# Patient Record
Sex: FEMALE | Race: BLACK OR AFRICAN AMERICAN | Employment: FULL TIME | URBAN - METROPOLITAN AREA
[De-identification: names, ages, dates, MRNs, and addresses within clinical notes are randomized per-mention and may not be internally consistent; named-entity substitution may affect disease eponyms.]

---

## 2018-06-25 PROBLEM — N92.6 IRREGULAR MENSES: Status: ACTIVE | Noted: 2018-06-25

## 2018-06-25 PROBLEM — Z11.3 SCREEN FOR STD (SEXUALLY TRANSMITTED DISEASE): Status: ACTIVE | Noted: 2018-06-25

## 2018-07-12 PROBLEM — Z30.46 NEXPLANON REMOVAL: Status: ACTIVE | Noted: 2018-07-12

## 2019-04-26 ENCOUNTER — HOSPITAL ENCOUNTER (EMERGENCY)
Age: 24
Discharge: HOME | End: 2019-04-26
Attending: EMERGENCY MEDICINE
Payer: COMMERCIAL

## 2019-04-26 VITALS
WEIGHT: 173 LBS | TEMPERATURE: 98.6 F | HEART RATE: 79 BPM | RESPIRATION RATE: 15 BRPM | SYSTOLIC BLOOD PRESSURE: 138 MMHG | OXYGEN SATURATION: 100 % | BODY MASS INDEX: 29.53 KG/M2 | HEIGHT: 64 IN | DIASTOLIC BLOOD PRESSURE: 83 MMHG

## 2019-04-26 DIAGNOSIS — R11.2 NAUSEA AND VOMITING, INTRACTABILITY OF VOMITING NOT SPECIFIED, UNSPECIFIED VOMITING TYPE: ICD-10-CM

## 2019-04-26 DIAGNOSIS — N30.00 ACUTE CYSTITIS WITHOUT HEMATURIA: Primary | ICD-10-CM

## 2019-04-26 LAB
BACTERIA URNS QL MICRO: NORMAL /HPF
CASTS URNS QL MICRO: 0 /LPF
CRYSTALS URNS QL MICRO: 0 /LPF
EPI CELLS #/AREA URNS HPF: 0 /HPF
HCG SERPL-ACNC: <1 MIU/ML (ref 0–6)
HCG UR QL: NEGATIVE
MUCOUS THREADS URNS QL MICRO: NORMAL /LPF
RBC #/AREA URNS HPF: NORMAL /HPF
WBC URNS QL MICRO: NORMAL /HPF

## 2019-04-26 PROCEDURE — 81025 URINE PREGNANCY TEST: CPT

## 2019-04-26 PROCEDURE — 99285 EMERGENCY DEPT VISIT HI MDM: CPT | Performed by: EMERGENCY MEDICINE

## 2019-04-26 PROCEDURE — 84702 CHORIONIC GONADOTROPIN TEST: CPT

## 2019-04-26 PROCEDURE — 74011250636 HC RX REV CODE- 250/636: Performed by: EMERGENCY MEDICINE

## 2019-04-26 PROCEDURE — 81015 MICROSCOPIC EXAM OF URINE: CPT

## 2019-04-26 PROCEDURE — 81003 URINALYSIS AUTO W/O SCOPE: CPT | Performed by: EMERGENCY MEDICINE

## 2019-04-26 PROCEDURE — 96374 THER/PROPH/DIAG INJ IV PUSH: CPT | Performed by: EMERGENCY MEDICINE

## 2019-04-26 RX ORDER — SODIUM CHLORIDE 0.9 % (FLUSH) 0.9 %
5-40 SYRINGE (ML) INJECTION EVERY 8 HOURS
Status: DISCONTINUED | OUTPATIENT
Start: 2019-04-26 | End: 2019-04-27 | Stop reason: HOSPADM

## 2019-04-26 RX ORDER — SODIUM CHLORIDE 9 MG/ML
1000 INJECTION, SOLUTION INTRAVENOUS ONCE
Status: COMPLETED | OUTPATIENT
Start: 2019-04-26 | End: 2019-04-26

## 2019-04-26 RX ORDER — ONDANSETRON 2 MG/ML
4 INJECTION INTRAMUSCULAR; INTRAVENOUS
Status: COMPLETED | OUTPATIENT
Start: 2019-04-26 | End: 2019-04-26

## 2019-04-26 RX ORDER — SODIUM CHLORIDE 0.9 % (FLUSH) 0.9 %
5-40 SYRINGE (ML) INJECTION AS NEEDED
Status: DISCONTINUED | OUTPATIENT
Start: 2019-04-26 | End: 2019-04-27 | Stop reason: HOSPADM

## 2019-04-26 RX ORDER — NITROFURANTOIN 25; 75 MG/1; MG/1
100 CAPSULE ORAL 2 TIMES DAILY
Qty: 14 CAP | Refills: 0 | Status: SHIPPED | OUTPATIENT
Start: 2019-04-26 | End: 2019-05-03

## 2019-04-26 RX ORDER — ONDANSETRON 8 MG/1
8 TABLET, ORALLY DISINTEGRATING ORAL
Qty: 12 TAB | Refills: 0 | Status: SHIPPED | OUTPATIENT
Start: 2019-04-26 | End: 2019-12-31 | Stop reason: CLARIF

## 2019-04-26 RX ADMIN — SODIUM CHLORIDE 1000 ML: 900 INJECTION, SOLUTION INTRAVENOUS at 22:21

## 2019-04-26 RX ADMIN — ONDANSETRON 4 MG: 2 INJECTION INTRAMUSCULAR; INTRAVENOUS at 22:21

## 2019-04-27 NOTE — DISCHARGE INSTRUCTIONS
Patient Education      Zofran every 6-8 hours as needed for nausea and vomiting. Small amounts of nonrigid Gatorade G2 Pedialyte or water every 5-10 minutes to prevent dehydration. If he vomited take a 20 minute break and started again. Advance diet as tolerated. Macrobid twice daily for 7 days for urinary tract infection. Return if any new, worsening or concerning symptoms. Follow up with your doctor or the doctor provided in 3-5 days if not improving. Nausea and Vomiting: Care Instructions  Your Care Instructions    When you are nauseated, you may feel weak and sweaty and notice a lot of saliva in your mouth. Nausea often leads to vomiting. Most of the time you do not need to worry about nausea and vomiting, but they can be signs of other illnesses. Two common causes of nausea and vomiting are stomach flu and food poisoning. Nausea and vomiting from viral stomach flu will usually start to improve within 24 hours. Nausea and vomiting from food poisoning may last from 12 to 48 hours. The doctor has checked you carefully, but problems can develop later. If you notice any problems or new symptoms, get medical treatment right away. Follow-up care is a key part of your treatment and safety. Be sure to make and go to all appointments, and call your doctor if you are having problems. It's also a good idea to know your test results and keep a list of the medicines you take. How can you care for yourself at home? · To prevent dehydration, drink plenty of fluids, enough so that your urine is light yellow or clear like water. Choose water and other caffeine-free clear liquids until you feel better. If you have kidney, heart, or liver disease and have to limit fluids, talk with your doctor before you increase the amount of fluids you drink. · Rest in bed until you feel better. · When you are able to eat, try clear soups, mild foods, and liquids until all symptoms are gone for 12 to 48 hours.  Other good choices include dry toast, crackers, cooked cereal, and gelatin dessert, such as Jell-O. When should you call for help? Call 911 anytime you think you may need emergency care. For example, call if:    · You passed out (lost consciousness).    Call your doctor now or seek immediate medical care if:    · You have symptoms of dehydration, such as:  ? Dry eyes and a dry mouth. ? Passing only a little dark urine. ? Feeling thirstier than usual.     · You have new or worsening belly pain.     · You have a new or higher fever.     · You vomit blood or what looks like coffee grounds.    Watch closely for changes in your health, and be sure to contact your doctor if:    · You have ongoing nausea and vomiting.     · Your vomiting is getting worse.     · Your vomiting lasts longer than 2 days.     · You are not getting better as expected. Where can you learn more? Go to http://dionna-kelsy.info/. Enter 25 [id number] in the search box to learn more about \"Nausea and Vomiting: Care Instructions. \"  Current as of: September 23, 2018  Content Version: 11.9  © 8942-0653 [email or web address]. Care instructions adapted under license by [email or web address] (which disclaims liability or warranty for this information). If you have questions about a medical condition or this instruction, always ask your healthcare professional. [place] [address] any warranty or liability for your use of this information. Patient Education        Urinary Tract Infection in Women: Care Instructions  Your Care Instructions    A urinary tract infection, or UTI, is a general term for an infection anywhere between the kidneys and the urethra (where urine comes out). Most UTIs are bladder infections. They often cause pain or burning when you urinate. UTIs are caused by bacteria and can be cured with antibiotics. Be sure to complete your treatment so that the infection goes away.   Follow-up care is a key part of your treatment and safety. Be sure to make and go to all appointments, and call your doctor if you are having problems. It's also a good idea to know your test results and keep a list of the medicines you take. How can you care for yourself at home? · Take your antibiotics as directed. Do not stop taking them just because you feel better. You need to take the full course of antibiotics. · Drink extra water and other fluids for the next day or two. This may help wash out the bacteria that are causing the infection. (If you have kidney, heart, or liver disease and have to limit fluids, talk with your doctor before you increase your fluid intake.)  · Avoid drinks that are carbonated or have caffeine. They can irritate the bladder. · Urinate often. Try to empty your bladder each time. · To relieve pain, take a hot bath or lay a heating pad set on low over your lower belly or genital area. Never go to sleep with a heating pad in place. To prevent UTIs  · Drink plenty of water each day. This helps you urinate often, which clears bacteria from your system. (If you have kidney, heart, or liver disease and have to limit fluids, talk with your doctor before you increase your fluid intake.)  · Urinate when you need to. · Urinate right after you have sex. · Change sanitary pads often. · Avoid douches, bubble baths, feminine hygiene sprays, and other feminine hygiene products that have deodorants. · After going to the bathroom, wipe from front to back. When should you call for help? Call your doctor now or seek immediate medical care if:    · Symptoms such as fever, chills, nausea, or vomiting get worse or appear for the first time.     · You have new pain in your back just below your rib cage.  This is called flank pain.     · There is new blood or pus in your urine.     · You have any problems with your antibiotic medicine.    Watch closely for changes in your health, and be sure to contact your doctor if:    · You are not getting better after taking an antibiotic for 2 days.     · Your symptoms go away but then come back. Where can you learn more? Go to http://dionna-kelsy.info/. Enter F965 in the search box to learn more about \"Urinary Tract Infection in Women: Care Instructions. \"  Current as of: March 20, 2018  Content Version: 11.9  © 1299-0938 [employer]. Care instructions adapted under license by [employer] (which disclaims liability or warranty for this information). If you have questions about a medical condition or this instruction, always ask your healthcare professional. [place] [address] any warranty or liability for your use of this information.

## 2019-04-27 NOTE — ED PROVIDER NOTES
Nausea and vomiting this morning. Missed her last menstrual cycle. patient believes she may be coming down with a urinary tract infection as well. No abdominal pain fever or back pain. The history is provided by the patient. Nausea This is a new problem. Episode onset: this morning. Episode frequency: vomited once or twice this morning. The problem has been gradually improving. The emesis has an appearance of stomach contents. There has been no fever. Pertinent negatives include no fever, no abdominal pain, no cough and no URI. The patient maybe pregnant. Vomiting Pertinent negatives include no fever, no abdominal pain, no cough and no URI. The patient maybe pregnant. History reviewed. No pertinent past medical history. Past Surgical History:  
Procedure Laterality Date  HX TYMPANOSTOMY Family History:  
Problem Relation Age of Onset  Hypertension Mother  Diabetes Mother  Diabetes Maternal Grandmother  Hypertension Maternal Grandmother  Kidney Disease Maternal Grandmother  Colon Cancer Maternal Grandfather  Cancer Paternal Grandmother  Breast Cancer Neg Hx   
 Ovarian Cancer Neg Hx  Uterine Cancer Neg Hx Social History Socioeconomic History  Marital status: SINGLE Spouse name: Not on file  Number of children: Not on file  Years of education: Not on file  Highest education level: Not on file Occupational History  Not on file Social Needs  Financial resource strain: Not on file  Food insecurity:  
  Worry: Not on file Inability: Not on file  Transportation needs:  
  Medical: Not on file Non-medical: Not on file Tobacco Use  Smoking status: Never Smoker  Smokeless tobacco: Never Used Substance and Sexual Activity  Alcohol use: No  
 Drug use: No  
 Sexual activity: Yes Birth control/protection: Implant Lifestyle  Physical activity:  
  Days per week: Not on file Minutes per session: Not on file  Stress: Not on file Relationships  Social connections:  
  Talks on phone: Not on file Gets together: Not on file Attends [employer] service: Not on file Active member of club or organization: Not on file Attends meetings of clubs or organizations: Not on file Relationship status: Not on file  Intimate partner violence:  
  Fear of current or ex partner: Not on file Emotionally abused: Not on file Physically abused: Not on file Forced sexual activity: Not on file Other Topics Concern [address] Service Not Asked  Blood Transfusions Not Asked  Caffeine Concern No  
 Occupational Exposure Not Asked Debe Moreno Hazards Not Asked  Sleep Concern Not Asked  Stress Concern Not Asked  Weight Concern Not Asked  Special Diet Not Asked  Back Care Not Asked  Exercise Yes  Bike Helmet Not Asked [address] Yes  Self-Exams Yes Social History Narrative Abuse: Feels safe at home, no history of physical abuse, no history of sexual abuse ALLERGIES: Latex Review of Systems Constitutional: Negative for fever. HENT: Negative for congestion and rhinorrhea. Respiratory: Negative for cough. Gastrointestinal: Positive for nausea and vomiting. Negative for abdominal pain. Endocrine: Negative for polyuria. Genitourinary: Positive for dysuria, frequency and urgency. Negative for flank pain. Musculoskeletal: Negative for back pain. Vitals:  
 04/26/19 2113 BP: 138/83 Pulse: 79 Resp: 15 Temp: 98.6 °F (37 °C) SpO2: 100% Weight: 78.5 kg (173 lb) Height: 5' 4\" (1.626 m) Physical Exam  
Constitutional: She appears well-developed and well-nourished. No distress. HENT:  
Head: Normocephalic. Mouth/Throat: Oropharynx is clear and moist.  
Eyes: Pupils are equal, round, and reactive to light. Cardiovascular: Normal rate, regular rhythm and normal heart sounds. Pulmonary/Chest: Effort normal and breath sounds normal.  
Abdominal: Soft. She exhibits no distension and no mass. There is no tenderness. There is no rebound and no guarding. Musculoskeletal: Normal range of motion. Lymphadenopathy:  
  She has no cervical adenopathy. Neurological: She is alert. Skin: Skin is warm and dry. Nursing note and vitals reviewed. MDM Number of Diagnoses or Management Options Diagnosis management comments: Urine pregnancy test is negative but the mother would like a blood pregnancy test due to her having negative urine pregnancy test up to 5 months in the past.  Patient will be hydrated while we wait on this. Currently only nausea no vomiting since this morning. No appetite this afternoon. She denies any diarrhea. Likely nausea and vomiting secondary to UTI or virus. UTI present Amount and/or Complexity of Data Reviewed Clinical lab tests: ordered and reviewed (Results for orders placed or performed during the hospital encounter of 04/26/19 
-URINE MICROSCOPIC Result                      Value             Ref Range WBC                         20-50             0 /hpf        
     RBC                         3-5               0 /hpf Epithelial cells            0                 0 /hpf Bacteria                    TRACE             0 /hpf Casts                       0                 0 /lpf Crystals, urine             0                 0 /LPF Mucus                       TRACE             0 /lpf -BETA HCG, QT Result                      Value             Ref Range Beta HCG, QT                <1                0.0 - 6.0 MI* 
-HCG URINE, QL. - POC Result                      Value             Ref Range Pregnancy test,urine (*     NEGATIVE          NEG           
) Procedures

## 2019-04-27 NOTE — ED TRIAGE NOTES
Pt states she missed her menstrual cycle last month, but started spotting today. Urine Pregnancy tests were negative.

## 2019-12-31 ENCOUNTER — HOSPITAL ENCOUNTER (EMERGENCY)
Age: 24
Discharge: HOME | End: 2019-12-31
Attending: EMERGENCY MEDICINE
Payer: COMMERCIAL

## 2019-12-31 VITALS
HEART RATE: 81 BPM | SYSTOLIC BLOOD PRESSURE: 112 MMHG | HEIGHT: 64 IN | DIASTOLIC BLOOD PRESSURE: 69 MMHG | OXYGEN SATURATION: 96 % | TEMPERATURE: 98.7 F | RESPIRATION RATE: 16 BRPM | BODY MASS INDEX: 26.29 KG/M2 | WEIGHT: 154 LBS

## 2019-12-31 DIAGNOSIS — B34.9 VIRAL ILLNESS: Primary | ICD-10-CM

## 2019-12-31 LAB
FLUAV AG NPH QL IA: NEGATIVE
FLUBV AG NPH QL IA: NEGATIVE
SPECIMEN SOURCE: NORMAL

## 2019-12-31 PROCEDURE — 87804 INFLUENZA ASSAY W/OPTIC: CPT

## 2019-12-31 PROCEDURE — 99282 EMERGENCY DEPT VISIT SF MDM: CPT | Performed by: NURSE PRACTITIONER

## 2019-12-31 NOTE — ED PROVIDER NOTES
Patient presents with nasal congestion, cough, generalized body aches, and sore throat since yesterday. She denies fever. She states she has been taking tylenol cold and flu. The history is provided by the patient. History reviewed. No pertinent past medical history.     Past Surgical History:   Procedure Laterality Date    HX TYMPANOSTOMY           Family History:   Problem Relation Age of Onset    Hypertension Mother     Diabetes Mother     Diabetes Maternal Grandmother     Hypertension Maternal Grandmother     Kidney Disease Maternal Grandmother     Colon Cancer Maternal Grandfather     Cancer Paternal Grandmother     Breast Cancer Neg Hx     Ovarian Cancer Neg Hx     Uterine Cancer Neg Hx        Social History     Socioeconomic History    Marital status: SINGLE     Spouse name: Not on file    Number of children: Not on file    Years of education: Not on file    Highest education level: Not on file   Occupational History    Not on file   Social Needs    Financial resource strain: Not on file    Food insecurity:     Worry: Not on file     Inability: Not on file    Transportation needs:     Medical: Not on file     Non-medical: Not on file   Tobacco Use    Smoking status: Never Smoker    Smokeless tobacco: Never Used   Substance and Sexual Activity    Alcohol use: No    Drug use: No    Sexual activity: Yes     Birth control/protection: Implant   Lifestyle    Physical activity:     Days per week: Not on file     Minutes per session: Not on file    Stress: Not on file   Relationships    Social connections:     Talks on phone: Not on file     Gets together: Not on file     Attends [employer] service: Not on file     Active member of club or organization: Not on file     Attends meetings of clubs or organizations: Not on file     Relationship status: Not on file    Intimate partner violence:     Fear of current or ex partner: Not on file     Emotionally abused: Not on file Physically abused: Not on file     Forced sexual activity: Not on file   Other Topics Concern     Service Not Asked    Blood Transfusions Not Asked    Caffeine Concern No    Occupational Exposure Not Asked    Hobby Hazards Not Asked    Sleep Concern Not Asked    Stress Concern Not Asked    Weight Concern Not Asked    Special Diet Not Asked    Back Care Not Asked    Exercise Yes    Bike Helmet Not Asked    Seat Belt Yes    Self-Exams Yes   Social History Narrative    Abuse: Feels safe at home, no history of physical abuse, no history of sexual abuse         ALLERGIES: Latex    Review of Systems   Constitutional: Negative for fever. HENT: Positive for congestion and sore throat. Respiratory: Positive for cough. Musculoskeletal: Positive for myalgias. Vitals:    12/31/19 1200   BP: 112/69   Pulse: 81   Resp: 16   Temp: 98.7 °F (37.1 °C)   SpO2: 96%   Weight: 69.9 kg (154 lb)   Height: 5' 4\" (1.626 m)            Physical Exam  Vitals signs and nursing note reviewed. Constitutional:       Appearance: Normal appearance. HENT:      Head: Normocephalic and atraumatic. Right Ear: Tympanic membrane is erythematous. Left Ear: Tympanic membrane is erythematous. Nose: Mucosal edema present. Mouth/Throat:      Mouth: Mucous membranes are moist.      Pharynx: Oropharynx is clear. Eyes:      Pupils: Pupils are equal, round, and reactive to light. Neck:      Musculoskeletal: Normal range of motion and neck supple. Cardiovascular:      Rate and Rhythm: Normal rate and regular rhythm. Pulses: Normal pulses. Heart sounds: Normal heart sounds. Pulmonary:      Effort: Pulmonary effort is normal.      Breath sounds: Normal breath sounds. Abdominal:      Tenderness: There is no tenderness. Skin:     General: Skin is warm and dry. Neurological:      General: No focal deficit present. Mental Status: She is alert.    Psychiatric:         Mood and Affect: Mood normal.         Behavior: Behavior normal.        Recent Results (from the past 12 hour(s))   INFLUENZA A & B AG (RAPID TEST)    Collection Time: 12/31/19 12:04 PM   Result Value Ref Range    Influenza A Ag NEGATIVE  NEG      Influenza B Ag NEGATIVE  NEG      Source NASOPHARYNGEAL           MDM  Number of Diagnoses or Management Options  Viral illness:   Diagnosis management comments: Flu negative.         Amount and/or Complexity of Data Reviewed  Clinical lab tests: ordered and reviewed    Patient Progress  Patient progress: stable         Procedures

## 2019-12-31 NOTE — ED TRIAGE NOTES
Pt states congested cough, sore throat and body aches since yesterday. States mother had the flu about five days ago.

## 2019-12-31 NOTE — DISCHARGE INSTRUCTIONS
Continue with the over the counter products for symptom relief. Rest and increase your fluids by mouth. Follow up with your primary care provider for a recheck if symptoms fail to improve or worsen. Return to the emergency department as needed.

## 2019-12-31 NOTE — ED NOTES
I have reviewed discharge instructions with the patient. Ms Waqas Gale verbalized understanding. Patient left ED via private vehicle to home alone. Opportunity for questions and clarification provided. Patient given no scripts. To continue your aftercare when you leave the hospital, you may receive an automated call from our care team to check in on how you are doing. This is a free service and part of our promise to provide the best care and service to meet your aftercare needs.  If you have questions, or wish to unsubscribe from this service please call [phone]. Thank you for Choosing our [facility] Emergency Department.

## 2019-12-31 NOTE — LETTER
[facility] EMERGENCY DEPT 
[id number] [facility] [id number] [phone] Work/School Note Date: 12/31/2019 To Whom It May concern: 
 
Mauricio Davis was seen and treated today in the emergency room by the following provider(s): 
Attending Provider: Aggie Christiansen MD 
Nurse Practitioner: URMILA Marie. Mauricio Davis needs to be excused from work 12/31-01/02/2020. She can return sooner if symptoms improve.   
 
Sincerely, 
 
 
 
 
URMILA Terrazas

## 2020-02-18 ENCOUNTER — APPOINTMENT (OUTPATIENT)
Dept: GENERAL RADIOLOGY | Age: 25
End: 2020-02-18
Attending: EMERGENCY MEDICINE
Payer: COMMERCIAL

## 2020-02-18 ENCOUNTER — HOSPITAL ENCOUNTER (EMERGENCY)
Age: 25
Discharge: HOME | End: 2020-02-18
Attending: STUDENT IN AN ORGANIZED HEALTH CARE EDUCATION/TRAINING PROGRAM
Payer: COMMERCIAL

## 2020-02-18 VITALS
HEIGHT: 65 IN | RESPIRATION RATE: 16 BRPM | WEIGHT: 155 LBS | HEART RATE: 94 BPM | SYSTOLIC BLOOD PRESSURE: 108 MMHG | BODY MASS INDEX: 25.83 KG/M2 | OXYGEN SATURATION: 100 % | DIASTOLIC BLOOD PRESSURE: 54 MMHG | TEMPERATURE: 98.4 F
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
## 2020-02-18 DIAGNOSIS — S16.1XXA STRAIN OF NECK MUSCLE, INITIAL ENCOUNTER: Primary | ICD-10-CM

## 2020-02-18 PROCEDURE — 99283 EMERGENCY DEPT VISIT LOW MDM: CPT

## 2020-02-18 PROCEDURE — 72040 X-RAY EXAM NECK SPINE 2-3 VW: CPT

## 2020-02-18 PROCEDURE — 74011250636 HC RX REV CODE- 250/636: Performed by: STUDENT IN AN ORGANIZED HEALTH CARE EDUCATION/TRAINING PROGRAM

## 2020-02-18 PROCEDURE — 96372 THER/PROPH/DIAG INJ SC/IM: CPT

## 2020-02-18 PROCEDURE — 74011250637 HC RX REV CODE- 250/637: Performed by: STUDENT IN AN ORGANIZED HEALTH CARE EDUCATION/TRAINING PROGRAM

## 2020-02-18 RX ORDER — CYCLOBENZAPRINE HCL 5 MG
10 TABLET ORAL
Qty: 20 TAB | Refills: 0 | Status: SHIPPED | OUTPATIENT
Start: 2020-02-18 | End: 2020-05-31

## 2020-02-18 RX ORDER — CYCLOBENZAPRINE HCL 10 MG
10 TABLET ORAL
Status: COMPLETED | OUTPATIENT
Start: 2020-02-18 | End: 2020-02-18

## 2020-02-18 RX ORDER — IBUPROFEN 800 MG/1
800 TABLET ORAL
Qty: 20 TAB | Refills: 0 | Status: SHIPPED | OUTPATIENT
Start: 2020-02-18 | End: 2020-02-25

## 2020-02-18 RX ORDER — KETOROLAC TROMETHAMINE 30 MG/ML
30 INJECTION, SOLUTION INTRAMUSCULAR; INTRAVENOUS
Status: COMPLETED | OUTPATIENT
Start: 2020-02-18 | End: 2020-02-18

## 2020-02-18 RX ADMIN — CYCLOBENZAPRINE 10 MG: 10 TABLET, FILM COATED ORAL at 19:33

## 2020-02-18 RX ADMIN — KETOROLAC TROMETHAMINE 30 MG: 30 INJECTION, SOLUTION INTRAMUSCULAR at 19:33
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
## 2020-02-19 NOTE — ED NOTES
I have reviewed discharge instructions with the patient. The patient verbalized understanding. Patient left ED via Discharge Method: ambulatory to Home with herself. Opportunity for questions and clarification provided. Patient given 2 scripts. To continue your aftercare when you leave the hospital, you may receive an automated call from our care team to check in on how you are doing. This is a free service and part of our promise to provide the best care and service to meet your aftercare needs.  If you have questions, or wish to unsubscribe from this service please call [phone]. Thank you for Choosing our [facility] Emergency Department.

## 2020-02-24 ENCOUNTER — HOSPITAL ENCOUNTER (EMERGENCY)
Age: 25
Discharge: HOME | End: 2020-02-25
Attending: EMERGENCY MEDICINE
Payer: COMMERCIAL

## 2020-02-24 DIAGNOSIS — M25.571 ACUTE RIGHT ANKLE PAIN: ICD-10-CM

## 2020-02-24 DIAGNOSIS — M25.561 ACUTE PAIN OF RIGHT KNEE: Primary | ICD-10-CM

## 2020-02-24 PROCEDURE — 99283 EMERGENCY DEPT VISIT LOW MDM: CPT

## 2020-02-25 ENCOUNTER — APPOINTMENT (OUTPATIENT)
Dept: GENERAL RADIOLOGY | Age: 25
End: 2020-02-25
Attending: EMERGENCY MEDICINE
Payer: COMMERCIAL

## 2020-02-25 VITALS
RESPIRATION RATE: 16 BRPM | DIASTOLIC BLOOD PRESSURE: 64 MMHG | SYSTOLIC BLOOD PRESSURE: 110 MMHG | TEMPERATURE: 98.8 F | WEIGHT: 154 LBS | BODY MASS INDEX: 25.66 KG/M2 | HEART RATE: 80 BPM | HEIGHT: 65 IN | OXYGEN SATURATION: 97 %

## 2020-02-25 PROCEDURE — 73610 X-RAY EXAM OF ANKLE: CPT

## 2020-02-25 PROCEDURE — 73562 X-RAY EXAM OF KNEE 3: CPT

## 2020-02-25 PROCEDURE — 74011250637 HC RX REV CODE- 250/637: Performed by: EMERGENCY MEDICINE

## 2020-02-25 RX ORDER — IBUPROFEN 800 MG/1
800 TABLET ORAL
Status: COMPLETED | OUTPATIENT
Start: 2020-02-25 | End: 2020-02-25

## 2020-02-25 RX ORDER — IBUPROFEN 800 MG/1
800 TABLET ORAL
Qty: 20 TAB | Refills: 0 | Status: SHIPPED | OUTPATIENT
Start: 2020-02-25 | End: 2020-03-03

## 2020-02-25 RX ADMIN — IBUPROFEN 800 MG: 800 TABLET, FILM COATED ORAL at 00:49

## 2020-02-25 NOTE — ED NOTES
I have reviewed discharge instructions with the patient. The patient verbalized understanding. Patient left ED via Discharge Method: ambulatory to Home with her family. Opportunity for questions and clarification provided. Patient given 1 scripts. To continue your aftercare when you leave the hospital, you may receive an automated call from our care team to check in on how you are doing. This is a free service and part of our promise to provide the best care and service to meet your aftercare needs.  If you have questions, or wish to unsubscribe from this service please call [phone]. Thank you for Choosing our [facility] Emergency Department.
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
## 2020-02-25 NOTE — DISCHARGE INSTRUCTIONS
Patient Education        Knee Pain or Injury: Care Instructions  Your Care Instructions    Injuries are a common cause of knee problems. Sudden (acute) injuries may be caused by a direct blow to the knee. They can also be caused by abnormal twisting, bending, or falling on the knee. Pain, bruising, or swelling may be severe, and may start within minutes of the injury. Overuse is another cause of knee pain. Other causes are climbing stairs, kneeling, and other activities that use the knee. Everyday wear and tear, especially as you get older, also can cause knee pain. Rest, along with home treatment, often relieves pain and allows your knee to heal. If you have a serious knee injury, you may need tests and treatment. Follow-up care is a key part of your treatment and safety. Be sure to make and go to all appointments, and call your doctor if you are having problems. It's also a good idea to know your test results and keep a list of the medicines you take. How can you care for yourself at home? · Be safe with medicines. Read and follow all instructions on the label. ? If the doctor gave you a prescription medicine for pain, take it as prescribed. ? If you are not taking a prescription pain medicine, ask your doctor if you can take an over-the-counter medicine. · Rest and protect your knee. Take a break from any activity that may cause pain. · Put ice or a cold pack on your knee for 10 to 20 minutes at a time. Put a thin cloth between the ice and your skin. · Prop up a sore knee on a pillow when you ice it or anytime you sit or lie down for the next 3 days. Try to keep it above the level of your heart. This will help reduce swelling. · If your knee is not swollen, you can put moist heat, a heating pad, or a warm cloth on your knee. · If your doctor recommends an elastic bandage, sleeve, or other type of support for your knee, wear it as directed.   · Follow your doctor's instructions about how much weight you can put on your leg. Use a cane, crutches, or a walker as instructed. · Follow your doctor's instructions about activity during your healing process. If you can do mild exercise, slowly increase your activity. · Reach and stay at a healthy weight. Extra weight can strain the joints, especially the knees and hips, and make the pain worse. Losing even a few pounds may help. When should you call for help? Call 911 anytime you think you may need emergency care. For example, call if:    · You have symptoms of a blood clot in your lung (called a pulmonary embolism). These may include:  ? Sudden chest pain. ? Trouble breathing. ? Coughing up blood.    Call your doctor now or seek immediate medical care if:    · You have severe or increasing pain.     · Your leg or foot turns cold or changes color.     · You cannot stand or put weight on your knee.     · Your knee looks twisted or bent out of shape.     · You cannot move your knee.     · You have signs of infection, such as:  ? Increased pain, swelling, warmth, or redness. ? Red streaks leading from the knee. ? Pus draining from a place on your knee. ? A fever.     · You have signs of a blood clot in your leg (called a deep vein thrombosis), such as:  ? Pain in your calf, back of the knee, thigh, or groin. ? Redness and swelling in your leg or groin.    Watch closely for changes in your health, and be sure to contact your doctor if:    · You have tingling, weakness, or numbness in your knee.     · You have any new symptoms, such as swelling.     · You have bruises from a knee injury that last longer than 2 weeks.     · You do not get better as expected. Where can you learn more? Go to http://dionna-kelsy.info/. Enter K195 in the search box to learn more about \"Knee Pain or Injury: Care Instructions. \"  Current as of: June 26, 2019  Content Version: 12.2  © 0302-1691 [employer].  Care instructions adapted under license by [facility] (which disclaims liability or warranty for this information). If you have questions about a medical condition or this instruction, always ask your healthcare professional. [address] any warranty or liability for your use of this information. Patient Education     Musculoskeletal Pain: Care Instructions  Your Care Instructions  Different problems with the bones, muscles, nerves, ligaments, and tendons in the body can cause pain. One or more areas of your body may ache or burn. Or they may feel tired, stiff, or sore. The medical term for this type of pain is musculoskeletal pain. It can have many different causes. Sometimes the pain is caused by an injury such as a strain or sprain. Or you might have pain from using one part of your body in the same way over and over again. This is called overuse. In some cases, the cause of the pain is another health problem such as arthritis or fibromyalgia. The doctor will examine you and ask you questions about your health to help find the cause of your pain. Blood tests or imaging tests like an X-ray may also be helpful. But sometimes doctors can't find a cause of the pain. Treatment depends on your symptoms and the cause of the pain, if known. The doctor has checked you carefully, but problems can develop later. If you notice any problems or new symptoms, get medical treatment right away. Follow-up care is a key part of your treatment and safety. Be sure to make and go to all appointments, and call your doctor if you are having problems. It's also a good idea to know your test results and keep a list of the medicines you take. How can you care for yourself at home? · Rest until you feel better. · Do not do anything that makes the pain worse. Return to exercise gradually if you feel better and your doctor says it's okay. · Be safe with medicines. Read and follow all instructions on the label.   ¨ If the doctor gave you a prescription medicine for pain, take it as prescribed. ¨ If you are not taking a prescription pain medicine, ask your doctor if you can take an over-the-counter medicine. · Put ice or a cold pack on the area for 10 to 20 minutes at a time to ease pain. Put a thin cloth between the ice and your skin. When should you call for help? Call your doctor now or seek immediate medical care if:  · You have new pain, or your pain gets worse. · You have new symptoms such as a fever, a rash, or chills. Watch closely for changes in your health, and be sure to contact your doctor if:  · You do not get better as expected. Where can you learn more? Go to 360incentives.com.be  Enter Q624 in the search box to learn more about \"Musculoskeletal Pain: Care Instructions. \"   © 4130-0113 Healthwise, Incorporated. Care instructions adapted under license by [facility] (which disclaims liability or warranty for this information). This care instruction is for use with your licensed healthcare professional. If you have questions about a medical condition or this instruction, always ask your healthcare professional. [place] [address] any warranty or liability for your use of this information.   Content Version: 07.1.236761; Current as of: November 20, 2015
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
## 2020-05-31 ENCOUNTER — HOSPITAL ENCOUNTER (EMERGENCY)
Age: 25
Discharge: HOME | End: 2020-05-31
Attending: EMERGENCY MEDICINE
Payer: COMMERCIAL

## 2020-05-31 VITALS
SYSTOLIC BLOOD PRESSURE: 132 MMHG | TEMPERATURE: 99 F | DIASTOLIC BLOOD PRESSURE: 76 MMHG | WEIGHT: 175 LBS | HEIGHT: 66 IN | BODY MASS INDEX: 28.12 KG/M2 | RESPIRATION RATE: 12 BRPM | OXYGEN SATURATION: 99 % | HEART RATE: 88 BPM

## 2020-05-31 DIAGNOSIS — N93.8 DUB (DYSFUNCTIONAL UTERINE BLEEDING): ICD-10-CM

## 2020-05-31 DIAGNOSIS — N92.6 IRREGULAR MENSES: Primary | ICD-10-CM

## 2020-05-31 DIAGNOSIS — R07.89 ATYPICAL CHEST PAIN: ICD-10-CM

## 2020-05-31 LAB
HCG SERPL QL: NEGATIVE
HCG UR QL: NEGATIVE
SERVICE CMNT-IMP: NORMAL
WET PREP GENITAL: NORMAL
WET PREP GENITAL: NORMAL

## 2020-05-31 PROCEDURE — 99284 EMERGENCY DEPT VISIT MOD MDM: CPT

## 2020-05-31 PROCEDURE — 93005 ELECTROCARDIOGRAM TRACING: CPT | Performed by: EMERGENCY MEDICINE

## 2020-05-31 PROCEDURE — 84703 CHORIONIC GONADOTROPIN ASSAY: CPT

## 2020-05-31 PROCEDURE — 87210 SMEAR WET MOUNT SALINE/INK: CPT

## 2020-05-31 PROCEDURE — 81025 URINE PREGNANCY TEST: CPT

## 2020-05-31 NOTE — ED NOTES
I have reviewed discharge instructions with the patient. The patient verbalized understanding. Patient left ED via Discharge Method: ambulatory to Home with family. Opportunity for questions and clarification provided. Patient given 0 scripts. To continue your aftercare when you leave the hospital, you may receive an automated call from our care team to check in on how you are doing. This is a free service and part of our promise to provide the best care and service to meet your aftercare needs.  If you have questions, or wish to unsubscribe from this service please call [phone]. Thank you for Choosing our [facility] Emergency Department.

## 2020-05-31 NOTE — ED PROVIDER NOTES
57-year-old G0, P0 presents to the emergency department complaining of mild lower abdominal cramping and some irregular vaginal bleeding 2 weeks late for her last period. She denies any fever chills nausea or vomiting. She does describe some increased breast tenderness. Denies UTI symptoms. Last night the patient also had a twinge of chest discomfort, none since then. No known exacerbating or alleviating factors. The history is provided by the patient and a parent. Vaginal Bleeding   This is a new problem. The current episode started 12 to 24 hours ago. The problem occurs daily. The problem has not changed since onset. Associated symptoms include chest pain and abdominal pain (Mild). Pertinent negatives include no headaches and no shortness of breath. Nothing aggravates the symptoms. Nothing relieves the symptoms. She has tried nothing for the symptoms. The treatment provided no relief. No past medical history on file.     Past Surgical History:   Procedure Laterality Date    HX TYMPANOSTOMY           Family History:   Problem Relation Age of Onset    Hypertension Mother     Diabetes Mother     Diabetes Maternal Grandmother     Hypertension Maternal Grandmother     Kidney Disease Maternal Grandmother     Colon Cancer Maternal Grandfather     Cancer Paternal Grandmother     Breast Cancer Neg Hx     Ovarian Cancer Neg Hx     Uterine Cancer Neg Hx        Social History     Socioeconomic History    Marital status: SINGLE     Spouse name: Not on file    Number of children: Not on file    Years of education: Not on file    Highest education level: Not on file   Occupational History    Not on file   Social Needs    Financial resource strain: Not on file    Food insecurity     Worry: Not on file     Inability: Not on file    Transportation needs     Medical: Not on file     Non-medical: Not on file   Tobacco Use    Smoking status: Never Smoker    Smokeless tobacco: Never Used   Substance and Sexual Activity    Alcohol use: No    Drug use: No    Sexual activity: Yes     Birth control/protection: Implant   Lifestyle    Physical activity     Days per week: Not on file     Minutes per session: Not on file    Stress: Not on file   Relationships    Social connections     Talks on phone: Not on file     Gets together: Not on file     Attends Sabianist service: Not on file     Active member of club or organization: Not on file     Attends meetings of clubs or organizations: Not on file     Relationship status: Not on file    Intimate partner violence     Fear of current or ex partner: Not on file     Emotionally abused: Not on file     Physically abused: Not on file     Forced sexual activity: Not on file   Other Topics Concern     Service Not Asked    Blood Transfusions Not Asked    Caffeine Concern No    Occupational Exposure Not Asked   Veto Risk Hazards Not Asked    Sleep Concern Not Asked    Stress Concern Not Asked    Weight Concern Not Asked    Special Diet Not Asked    Back Care Not Asked    Exercise Yes    Bike Helmet Not Asked    Seat Belt Yes    Self-Exams Yes   Social History Narrative    Abuse: Feels safe at home, no history of physical abuse, no history of sexual abuse         ALLERGIES: Latex    Review of Systems   Constitutional: Negative for chills and fever. Respiratory: Negative for shortness of breath. Cardiovascular: Positive for chest pain. Negative for palpitations and leg swelling. Gastrointestinal: Positive for abdominal pain (Mild). Negative for nausea and vomiting. Genitourinary: Positive for vaginal bleeding. Neurological: Negative for headaches. All other systems reviewed and are negative.       Vitals:    05/31/20 1746 05/31/20 1747 05/31/20 1755   BP: 137/70  137/70   Pulse:   84   Resp:   16   Temp:   99.2 °F (37.3 °C)   SpO2:  99% 99%   Weight:   79.4 kg (175 lb)   Height:   5' 6\" (1.676 m)            Physical Exam  Vitals signs and nursing note reviewed. Constitutional:       General: She is not in acute distress. Appearance: She is well-developed. HENT:      Head: Normocephalic and atraumatic. Right Ear: External ear normal.      Left Ear: External ear normal.      Nose: Nose normal.      Mouth/Throat:      Mouth: Mucous membranes are moist.   Eyes:      Extraocular Movements: Extraocular movements intact. Conjunctiva/sclera: Conjunctivae normal.      Pupils: Pupils are equal, round, and reactive to light. Neck:      Musculoskeletal: Normal range of motion and neck supple. Cardiovascular:      Rate and Rhythm: Normal rate and regular rhythm. Pulses: Normal pulses. Heart sounds: Normal heart sounds. No murmur. Pulmonary:      Effort: Pulmonary effort is normal.      Breath sounds: Normal breath sounds. Abdominal:      General: Bowel sounds are normal. There is no distension. Palpations: Abdomen is soft. There is no mass. Tenderness: There is no abdominal tenderness. There is no right CVA tenderness or left CVA tenderness. Hernia: No hernia is present. Musculoskeletal: Normal range of motion. Skin:     General: Skin is warm and dry. Capillary Refill: Capillary refill takes less than 2 seconds. Neurological:      General: No focal deficit present. Mental Status: She is alert and oriented to person, place, and time.    Psychiatric:         Mood and Affect: Mood normal.         Behavior: Behavior normal.          MDM  Number of Diagnoses or Management Options  Atypical chest pain: new and requires workup  DUB (dysfunctional uterine bleeding): new and requires workup  Irregular menses: new and requires workup     Amount and/or Complexity of Data Reviewed  Clinical lab tests: ordered and reviewed  Review and summarize past medical records: yes    Risk of Complications, Morbidity, and/or Mortality  Presenting problems: moderate  Diagnostic procedures: minimal  Management options: moderate    Patient Progress  Patient progress: stable         Procedures      Results Reviewed:      Recent Results (from the past 24 hour(s))   HCG URINE, QL. - POC    Collection Time: 05/31/20  6:19 PM   Result Value Ref Range    Pregnancy test,urine (POC) Negative NEG     HCG QL SERUM    Collection Time: 05/31/20  6:32 PM   Result Value Ref Range    HCG, Ql. Negative NEG     WET PREP    Collection Time: 05/31/20  6:32 PM   Result Value Ref Range    Special Requests: NO SPECIAL REQUESTS      Wet prep 5 TO 10 WBCS PER HPF     Wet prep NO YEAST,TRICHOMONAS OR CLUE CELLS NOTED         I discussed the results of all labs, procedures, radiographs, and treatments with the patient and available family. Treatment plan is agreed upon and the patient is ready for discharge. All voiced understanding of the discharge plan and medication instructions or changes as appropriate. Questions about treatment in the ED were answered. All were encouraged to return should symptoms worsen or new problems develop.

## 2020-05-31 NOTE — DISCHARGE INSTRUCTIONS
Patient Education        Abnormal Uterine Bleeding: Care Instructions  Your Care Instructions     Abnormal uterine bleeding is irregular bleeding from the uterus that is longer or heavier than usual or does not occur at your regular time. Sometimes it is caused by changes in hormone levels. It can also be caused by growths in the uterus, such as fibroids or polyps. Sometimes a cause cannot be found. You may have heavy bleeding when you are not expecting your period. Your doctor may suggest a pregnancy test, if you think you are pregnant. Follow-up care is a key part of your treatment and safety. Be sure to make and go to all appointments, and call your doctor if you are having problems. It's also a good idea to know your test results and keep a list of the medicines you take. How can you care for yourself at home? · Be safe with medicines. Take pain medicines exactly as directed. ? If the doctor gave you a prescription medicine for pain, take it as prescribed. ? If you are not taking a prescription pain medicine, ask your doctor if you can take an over-the-counter medicine. · You may be low in iron because of blood loss. Eat a balanced diet that is high in iron and vitamin C. Foods rich in iron include red meat, shellfish, eggs, beans, and leafy green vegetables. Talk to your doctor about whether you need to take iron pills or a multivitamin. When should you call for help? [id number] anytime you think you may need emergency care. For example, call if:  · You passed out (lost consciousness). Call your doctor now or seek immediate medical care if:  · You have new or worse belly or pelvic pain. · You have severe vaginal bleeding. · You feel dizzy or lightheaded, or you feel like you may faint. Watch closely for changes in your health, and be sure to contact your doctor if:  · You think you may be pregnant. · Your bleeding gets worse. · You do not get better as expected. Where can you learn more?   Go to http://dionna-kelsy.info/  Enter [id number] in the search box to learn more about \"Abnormal Uterine Bleeding: Care Instructions. \"  Current as of: November 8, 2019               Content Version: 12.5  © 6128-6358 [employer], [employer]. Care instructions adapted under license by [employer] (which disclaims liability or warranty for this information). If you have questions about a medical condition or this instruction, always ask your healthcare professional. [place] [address] any warranty or liability for your use of this information.

## 2020-05-31 NOTE — ED TRIAGE NOTES
Pt states she should have started her menstrual cycle on 5/21, but started having light bleeding on 5/29. Pt also c/o intermittent cramping on LLQ and lower back pain. Pt denies being on birth control and unsure if she could be pregnant. Pt states she had a negative pregnancy test on Friday. Pt also c/o chest \"pullness\" that started last night. Pt denies any current chest pain.

## 2020-06-30 PROBLEM — Z01.419 ENCOUNTER FOR ANNUAL ROUTINE GYNECOLOGICAL EXAMINATION: Status: ACTIVE | Noted: 2020-06-30

## 2020-06-30 PROBLEM — Z30.46 NEXPLANON REMOVAL: Status: RESOLVED | Noted: 2018-07-12 | Resolved: 2020-06-30

## 2021-01-19 ENCOUNTER — HOSPITAL ENCOUNTER (EMERGENCY)
Age: 26
Discharge: HOME | End: 2021-01-19
Attending: EMERGENCY MEDICINE
Payer: COMMERCIAL

## 2021-01-19 VITALS
DIASTOLIC BLOOD PRESSURE: 78 MMHG | RESPIRATION RATE: 16 BRPM | HEIGHT: 64 IN | WEIGHT: 173 LBS | TEMPERATURE: 98.9 F | OXYGEN SATURATION: 98 % | BODY MASS INDEX: 29.53 KG/M2 | HEART RATE: 81 BPM | SYSTOLIC BLOOD PRESSURE: 115 MMHG

## 2021-01-19 DIAGNOSIS — R11.2 NON-INTRACTABLE VOMITING WITH NAUSEA, UNSPECIFIED VOMITING TYPE: Primary | ICD-10-CM

## 2021-01-19 LAB
ALBUMIN SERPL-MCNC: 3.8 G/DL (ref 3.5–5)
ALBUMIN/GLOB SERPL: 0.9 {RATIO} (ref 1.2–3.5)
ALP SERPL-CCNC: 56 U/L (ref 50–130)
ALT SERPL-CCNC: 22 U/L (ref 12–65)
ANION GAP SERPL CALC-SCNC: 8 MMOL/L (ref 7–16)
AST SERPL-CCNC: 18 U/L (ref 15–37)
BACTERIA URNS QL MICRO: NORMAL /HPF
BASOPHILS # BLD: 0 K/UL (ref 0–0.2)
BASOPHILS NFR BLD: 0 % (ref 0–2)
BILIRUB SERPL-MCNC: 0.6 MG/DL (ref 0.2–1.1)
BUN SERPL-MCNC: 10 MG/DL (ref 6–23)
CALCIUM SERPL-MCNC: 9.5 MG/DL (ref 8.3–10.4)
CASTS URNS QL MICRO: NORMAL /LPF
CHLORIDE SERPL-SCNC: 104 MMOL/L (ref 98–107)
CO2 SERPL-SCNC: 25 MMOL/L (ref 21–32)
CREAT SERPL-MCNC: 0.56 MG/DL (ref 0.6–1)
DIFFERENTIAL METHOD BLD: NORMAL
EOSINOPHIL # BLD: 0 K/UL (ref 0–0.8)
EOSINOPHIL NFR BLD: 1 % (ref 0.5–7.8)
EPI CELLS #/AREA URNS HPF: NORMAL /HPF
ERYTHROCYTE [DISTWIDTH] IN BLOOD BY AUTOMATED COUNT: 11.9 % (ref 11.9–14.6)
GLOBULIN SER CALC-MCNC: 4.2 G/DL (ref 2.3–3.5)
GLUCOSE SERPL-MCNC: 85 MG/DL (ref 65–100)
HCT VFR BLD AUTO: 42.9 % (ref 35.8–46.3)
HGB BLD-MCNC: 14.7 G/DL (ref 11.7–15.4)
IMM GRANULOCYTES # BLD AUTO: 0 K/UL (ref 0–0.5)
IMM GRANULOCYTES NFR BLD AUTO: 0 % (ref 0–5)
LIPASE SERPL-CCNC: 115 U/L (ref 73–393)
LYMPHOCYTES # BLD: 1.4 K/UL (ref 0.5–4.6)
LYMPHOCYTES NFR BLD: 30 % (ref 13–44)
MCH RBC QN AUTO: 31.4 PG (ref 26.1–32.9)
MCHC RBC AUTO-ENTMCNC: 34.3 G/DL (ref 31.4–35)
MCV RBC AUTO: 91.7 FL (ref 79.6–97.8)
MONOCYTES # BLD: 0.5 K/UL (ref 0.1–1.3)
MONOCYTES NFR BLD: 11 % (ref 4–12)
NEUTS SEG # BLD: 2.7 K/UL (ref 1.7–8.2)
NEUTS SEG NFR BLD: 58 % (ref 43–78)
NRBC # BLD: 0 K/UL (ref 0–0.2)
PLATELET # BLD AUTO: 279 K/UL (ref 150–450)
PMV BLD AUTO: 10.4 FL (ref 9.4–12.3)
POTASSIUM SERPL-SCNC: 3.7 MMOL/L (ref 3.5–5.1)
PROT SERPL-MCNC: 8 G/DL (ref 6.3–8.2)
RBC # BLD AUTO: 4.68 M/UL (ref 4.05–5.2)
RBC #/AREA URNS HPF: NORMAL /HPF
SODIUM SERPL-SCNC: 137 MMOL/L (ref 136–145)
WBC # BLD AUTO: 4.7 K/UL (ref 4.3–11.1)
WBC URNS QL MICRO: NORMAL /HPF

## 2021-01-19 PROCEDURE — 96374 THER/PROPH/DIAG INJ IV PUSH: CPT

## 2021-01-19 PROCEDURE — 74011250636 HC RX REV CODE- 250/636: Performed by: PHYSICIAN ASSISTANT

## 2021-01-19 PROCEDURE — 80053 COMPREHEN METABOLIC PANEL: CPT

## 2021-01-19 PROCEDURE — 96361 HYDRATE IV INFUSION ADD-ON: CPT

## 2021-01-19 PROCEDURE — 81015 MICROSCOPIC EXAM OF URINE: CPT

## 2021-01-19 PROCEDURE — 83690 ASSAY OF LIPASE: CPT

## 2021-01-19 PROCEDURE — 99283 EMERGENCY DEPT VISIT LOW MDM: CPT

## 2021-01-19 PROCEDURE — 85025 COMPLETE CBC W/AUTO DIFF WBC: CPT

## 2021-01-19 RX ORDER — ONDANSETRON 4 MG/1
4 TABLET, ORALLY DISINTEGRATING ORAL
Qty: 6 TAB | Refills: 0 | Status: SHIPPED | OUTPATIENT
Start: 2021-01-19

## 2021-01-19 RX ORDER — ONDANSETRON 2 MG/ML
4 INJECTION INTRAMUSCULAR; INTRAVENOUS
Status: COMPLETED | OUTPATIENT
Start: 2021-01-19 | End: 2021-01-19

## 2021-01-19 RX ADMIN — SODIUM CHLORIDE 1000 ML: 900 INJECTION, SOLUTION INTRAVENOUS at 11:27

## 2021-01-19 RX ADMIN — ONDANSETRON 4 MG: 2 INJECTION INTRAMUSCULAR; INTRAVENOUS at 11:28

## 2021-01-19 NOTE — ED PROVIDER NOTES
To ER complaining of continued nausea vomiting she is 9 weeks pregnant G1, she has had ultrasound that showed single IUP. GYN has placed her on B12 and Unisom for her nausea with minimal relief. She was diagnosed with Covid last week she said now she has lost her, she has had no constipation or diarrhea no urinary symptoms denies any abdominal pain no vaginal bleeding, no chest  pain or shortness of breath    The history is provided by the patient. Pregnancy Problem   This is a new problem. The current episode started 2 days ago. The problem occurs constantly. The problem has not changed since onset. The pain is associated with vomiting. The patient is experiencing no pain. Associated symptoms include nausea and vomiting. Pertinent negatives include no diarrhea, no constipation, no dysuria and no frequency. Nothing worsens the pain. The pain is relieved by nothing. Past workup includes ultrasound. Her past medical history does not include irritable bowel syndrome, UTI, pancreatitis, diverticulitis or DM. History reviewed. No pertinent past medical history.     Past Surgical History:   Procedure Laterality Date    HX TYMPANOSTOMY           Family History:   Problem Relation Age of Onset    Hypertension Mother     Diabetes Mother     Diabetes Maternal Grandmother     Hypertension Maternal Grandmother     Kidney Disease Maternal Grandmother     Colon Cancer Maternal Grandfather     Cancer Paternal Grandmother     Breast Cancer Neg Hx     Ovarian Cancer Neg Hx     Uterine Cancer Neg Hx        Social History     Socioeconomic History    Marital status: SINGLE     Spouse name: Not on file    Number of children: Not on file    Years of education: Not on file    Highest education level: Not on file   Occupational History    Not on file   Social Needs    Financial resource strain: Not on file    Food insecurity     Worry: Not on file     Inability: Not on file    Transportation needs     Medical: Not on file     Non-medical: Not on file   Tobacco Use    Smoking status: Never Smoker    Smokeless tobacco: Never Used   Substance and Sexual Activity    Alcohol use: No    Drug use: No    Sexual activity: Yes     Partners: Male     Birth control/protection: None   Lifestyle    Physical activity     Days per week: Not on file     Minutes per session: Not on file    Stress: Not on file   Relationships    Social connections     Talks on phone: Not on file     Gets together: Not on file     Attends Yarsani service: Not on file     Active member of club or organization: Not on file     Attends meetings of clubs or organizations: Not on file     Relationship status: Not on file    Intimate partner violence     Fear of current or ex partner: Not on file     Emotionally abused: Not on file     Physically abused: Not on file     Forced sexual activity: Not on file   Other Topics Concern     Service Not Asked    Blood Transfusions Not Asked    Caffeine Concern No    Occupational Exposure Not Asked   [employer] [place] Hazards Not Asked    Sleep Concern Not Asked    Stress Concern Not Asked    Weight Concern Not Asked    Special Diet Not Asked    Back Care Not Asked    Exercise Yes    Bike Helmet Not Asked    Seat Belt Yes    Self-Exams Yes   Social History Narrative    Abuse: Feels safe at home, no history of physical abuse, no history of sexual abuse         ALLERGIES: Latex, Shellfish derived, and Tomato    Review of Systems   Gastrointestinal: Positive for nausea and vomiting. Negative for constipation and diarrhea. Genitourinary: Negative for dysuria and frequency. All other systems reviewed and are negative. Vitals:    01/19/21 1100   BP: 115/78   Pulse: 81   Resp: 16   Temp: 98.9 °F (37.2 °C)   SpO2: 98%   Weight: 78.5 kg (173 lb)   Height: 5' 4\" (1.626 m)            Physical Exam  Vitals signs and nursing note reviewed. Constitutional:       General: She is not in acute distress. Appearance: Normal appearance. She is well-developed and normal weight. She is not diaphoretic. HENT:      Head: Normocephalic and atraumatic. Nose: Nose normal.      Mouth/Throat:      Pharynx: No oropharyngeal exudate or posterior oropharyngeal erythema. Eyes:      Pupils: Pupils are equal, round, and reactive to light. Neck:      Musculoskeletal: Normal range of motion and neck supple. Cardiovascular:      Rate and Rhythm: Normal rate and regular rhythm. Pulmonary:      Effort: Pulmonary effort is normal.      Breath sounds: Normal breath sounds. No wheezing or rhonchi. Abdominal:      General: Abdomen is flat. Bowel sounds are normal.      Palpations: Abdomen is soft. Tenderness: There is no abdominal tenderness. Hernia: No hernia is present. Musculoskeletal: Normal range of motion. Skin:     General: Skin is warm. Neurological:      General: No focal deficit present. Mental Status: She is alert and oriented to person, place, and time.    Psychiatric:         Mood and Affect: Mood normal.         Behavior: Behavior normal.          MDM  Number of Diagnoses or Management Options  Diagnosis management comments: Cbc, cmp,lipase normal  Pt feeling better after 1 liter ns and 4mg zofran  Urine dip good, will give rx for few zofran, see ob for recheck        Amount and/or Complexity of Data Reviewed  Clinical lab tests: ordered and reviewed  Review and summarize past medical records: yes  Discuss the patient with other providers: yes    Risk of Complications, Morbidity, and/or Mortality  Presenting problems: moderate  Diagnostic procedures: low  Management options: low    Patient Progress  Patient progress: improved         Procedures

## 2021-01-19 NOTE — ED TRIAGE NOTES
Pt presents to ED c/o vomiting and loss of appetite. States she is 9 weeks pregnant and COVID positive. States she tested positive on 1/13. [facility].

## 2021-01-19 NOTE — PROGRESS NOTES
Visited with patient as no PCP listed in chart. Demographics on face sheet verified. Patient states she was scheduled to see her new PCP Dr. Gay Vargas today, but ended up coming in here. She has rescheduled for Feb 3rd. No further needs at this time.

## 2021-01-19 NOTE — LETTER
[address] [address] EMERGENCY DEPT 
[address] [facility] [id number] [phone] Work/School Note Date: 1/19/2021 To Whom It May concern: 
 
Ivelisse Davis was seen and treated today in the emergency room by the following provider(s): 
Attending Provider: Kimo Jc MD 
Physician Assistant: VLADISLAV Rogel.   
 
Ivelisse Davis is excused from work/school on 01/19/21 and 01/20/21. She is medically clear to return to work/school on 1/21/2021. Sincerely, VLADISLAV Corbett

## 2021-01-19 NOTE — ED NOTES
I have reviewed discharge instructions with the patient. The patient verbalized understanding. Patient left ED via Discharge Method: ambulatory to Home with self    Opportunity for questions and clarification provided. Patient given 1 scripts. To continue your aftercare when you leave the hospital, you may receive an automated call from our care team to check in on how you are doing. This is a free service and part of our promise to provide the best care and service to meet your aftercare needs.  If you have questions, or wish to unsubscribe from this service please call [phone]. Thank you for Choosing our [facility] Emergency Department. present

## 2022-03-18 PROBLEM — Z11.3 SCREEN FOR STD (SEXUALLY TRANSMITTED DISEASE): Status: ACTIVE | Noted: 2018-06-25

## 2022-03-19 PROBLEM — Z01.419 ENCOUNTER FOR ANNUAL ROUTINE GYNECOLOGICAL EXAMINATION: Status: ACTIVE | Noted: 2020-06-30

## 2022-03-20 PROBLEM — N92.6 IRREGULAR MENSES: Status: ACTIVE | Noted: 2018-06-25

## 2023-01-24 NOTE — PROGRESS NOTES
Willian Calderon  : 1995  Primary: Susana Altamirano ([facility])  Secondary:  [address] @ [employer] [facility] [address] [address]  Phone: [phone]  Fax: [phone] Plan Frequency: one time a week for 90 days  Plan of Care/Certification Expiration Date: 23    PT Visit Info:  Plan Frequency: one time a week for 90 days  Plan of Care/Certification Expiration Date: 23    Visit Count:  1    OUTPATIENT PHYSICAL THERAPY:OP NOTE TYPE: Treatment Note 2023       Episode  }Appt Desk             Treatment Diagnosis:   Lack of coordination (muscle incoordination) (R27.8)  Pelvic floor dysfunction in female (M62.98)  Generalized weakness (M62.81)  Pelvic and perineal pain (R10.2)  Low back pain (M54.5 )  Medical/Referring Diagnosis:  Pelvic and perineal pain [R10.2]  Referring Physician:  Darshana Jeffers MD MD Orders:  PT Eval and Treat   Date of Onset:  No data recorded   Allergies:   Latex, Shellfish allergy, and Tomato  Restrictions/Precautions:  No data recorded  No data recorded   Interventions Planned (Treatment may consist of any combination of the following):    Current Treatment Recommendations: Strengthening; ROM; ADL/Self-care training; Neuromuscular re-education; Manual; Pain management; Home exercise program; Safety education & training; Patient/Caregiver education & training; Equipment evaluation, education, & procurement; Modalities; Therapeutic activities     Subjective Comments:     Initial:}    9/10Post Session:        /10  Medications Last Reviewed:  2023  Updated Objective Findings:  See evaluation note from today  Treatment   THERAPEUTIC EXERCISE: (10 minutes):    Exercises per grid below to improve mobility, strength, and coordination. Required minimal visual, verbal, manual, and tactile cues to promote proper body alignment, promote proper body posture, promote proper body mechanics, and promote proper body breathing techniques. Progressed resistance, range, repetitions, and complexity of movement as indicated. Date:  1-25-23 Date:   Date:     Activity/Exercise Parameters Parameters Parameters   Patient Education Discussed HEP and POC. Discussed contraction of core to stabilize pelvis during transitional movements     TrA activation X10 and given handout     Pelvic Band/Belt Educated patient on two different types of wear to support back and pelvis        All exercises performed with emphasis on kegel and breathing in order improve coordination, awareness and to minimize symptoms. MANUAL THERAPY: (5 minutes): to improve soft tissue tone    Date Type Location Comments   1/25/2023 Internal assessment/treatment pelvic Left HS/Right quads for MET                                       (Used abbreviations: MET - muscle energy technique; SCS- Strain counter strain; CTM-Connective tissue mobilizations; CR- Contract/Relax; SP- Sustained pressure; PIT- Positional inhibition techniques; STM Soft -tissue mobilization; MM- Myofascial mobilization; TrP-Trigger point release; IASTM- Instrument assisted soft tissue mobilizations, TDN-Trigger point dry needling)    Pt gives verbal consent to internal vaginal assessment/treatment without chaperon present. NEURO REEDUCATION: () to improve control and coordination of pelvic floor     Date:   Date:   Date:     Activity/Exercise Parameters Parameters Parameters   Biofeedback With sEMG was utilized for coordination of PFM.                                                 THERAPEUTIC ACTIVITY: ( minutes):     TA Educational Topic Notes Date Completed   Pathology/Anatomy/PFM Function     Bladder health education     Urinary urge suppression     The knack     Voiding strategies     Nocturia tips     Bowel/Bladder log     Bowel health education     Constipation care     Diarrhea/Fecal leakage     Colonic massage     Toilet positioning     Defecation dynamics     Sources of fiber     Return to intercourse/Dilator training     Sexual positioning     Lubricants/vaginal moisturizers     Vaginal irritants     Body mechanics     Posture/ergonomics     Diaphragmatic breathing     Resources and technology           Treatment/Session Summary:    Treatment Assessment:     Communication/Consultation:  None today  Equipment provided today:  None  Recommendations/Intent for next treatment session: Next visit will focus on core. Total Treatment Billable Duration:  15 minutes  Time In: 1100  Time Out: 413 Veda Hernandez.  Xu Mendez, PT       Charge Capture  }Post Session Pain  PT Visit Info  [facility]  MD Guidelines  Scanned Media  Benefits  MyChart    Future Appointments   Date Time Provider Bridgett Nolasco   2/9/2023  2:00 PM Charlette Malone, PT [facility]   2/15/2023  2:00 PM Charlette Malone, PT [facility]   2/20/2023  2:00 PM [facility]   2/27/2023  2:00 PM Charlette Malone, PT [facility]

## 2023-01-24 NOTE — THERAPY EVALUATION
Chloe Khan  : 1995  Primary: Andreia Bowers Sc ([facility])  Secondary:  [address] @ [employer] [facility] [place] [address] [address]  Phone: [phone]  Fax: [phone] Plan Frequency: one time a week for 90 days  Plan of Care/Certification Expiration Date: 23    PT Visit Info:  Plan Frequency: one time a week for 90 days  Plan of Care/Certification Expiration Date: 23    Visit Count:  1                OUTPATIENT PHYSICAL THERAPY:             OP NOTE TYPE: Initial Assessment 2023               Episode (pelvic and perineal pain) Appt Desk         Treatment Diagnosis:  Lack of coordination (muscle incoordination) (R27.8)  Pelvic floor dysfunction in female (M62.98)  Generalized weakness (M62.81)  Pelvic and perineal pain (R10.2)  Low back pain (M54.5 )  Medical/Referring Diagnosis:  Pelvic and perineal pain [R10.2]  Referring Physician:  Shira Barroso MD MD Orders:  PT Eval and Treat   Return MD Appt:    Date of Onset:       Allergies:  Latex, Shellfish allergy, and Tomato  Restrictions/Precautions:           Medications Last Reviewed:  2023     SUBJECTIVE   History of Injury/Illness (Reason for Referral):  Ms. Rocha is a 23 yo female referred to pelvic floor physical therapy (PFPT) by Shira Barroso MD 2/2 Pelvic and perineal pain [R10.2] Pt reports that symptoms began 10 weeks ago. Patient is 30 weeks. She reports pelvic/back/pubic bone pain. If she walks it can shoot into her inner thighs and down legs. If she sits for a long time she will get pain in hips/lower abdomen. She has a belly belt. It doesn't help. Gomez band in her lower back. Baby is pressing on her spine. Pain is eased with lying down. Tossing and turning hurts. Urinary: Frequency 9-12 x/day, 2-3 x/night. Positive for urinary urgency and urinary frequency.    Negative for stress urinary incontinence, urge urinary incontinence, incomplete emptying, urinary hesitancy/intermittency, dysuria, hematuria, nocturia, and nocturnal enuresis. Pt does not use pads for urinary protection; 0 pads per day (PPD). Fluid intake: couple cups water/day; bladder irritants include: milk. Pt does not limit fluid intake due to bladder control. Bowel: Frequency every other day. Positive for  none . Negative for pain with bowel movement, pushing/straining with bowel movement, fecal incontinence, constipation, and incomplete emptying. Pt does not use pads for bowel protection; 0 pads per day (PPD). Use of stool softeners or laxatives? No    Sexual: Pt is not sexually active Contraception/birth control: none. negative for dyspareunia. Pelvic Organ Prolapse/Pelvic Pain: 10/10 all the time unless lying down its 8/9. OB History: Number of pregnancies: 2 Number of vaginal deliveries: 1 Number of c-sections: 0. Initial:     9/10 Post Session:      /10  Past Medical History/Comorbidities:   Ms. Hung Westfall  has no past medical history on file. Ms. Hung Westfall  has a past surgical history that includes Tympanostomy tube placement. Social History/Living Environment:   Lives With: Family     Prior Level of Function/Work/Activity:   Prior level of function: Independent  Occupation: Full time employment  Type of Occupation: title management  Leisure & Hobbies: none currently           Learning:   Does the patient/guardian have any barriers to learning?: No barriers  Will there be a co-learner?: No  What is the preferred language of the patient/guardian?: English  Is an  required?: No  How does the patient/guardian prefer to learn new concepts?: Listening; Reading; Demonstration; Pictures/Videos     Fall Risk Scale:    [place] Total Score: 0  [place] Fall Risk: Low (0-24)           OBJECTIVE   OBSERVATION:   External Observations:  Voluntary contraction: [] absent     [x] present  Involuntary contraction: [] absent     [x] present  Involuntary relaxation: [] absent [x] present  Perineal descent at rest: [] absent     [x] present  Perineal descent with bearing: [] absent     [x] present  Postural assessment: increased lumbar lordosis, patient using belly band  Gait: antalgic    Pelvic Floor Muscle (PFM) Assessment:  Vaginal vault size: [] decreased     [] increased     [x] WNL  Muscle volume: [] decreased     [x] WNL     [] Defect  PFM tension: [] decreased     [x] increased     [] WNL    Contraction ability:  Voluntary contraction: [] absent     [x] weak     [] moderate      [] strong  Voluntary relaxation: [] absent     [x] partial     [] complete   Overflow: [] absent     [] min     [x] mod     [] severe / Compensatory mm groups include abdominals  Levator Avulsion: [x] Negative  [] Positive    Tissue laxity test:  Anterior wall: [] minimum     [] moderate     [] severe      [x] WNL  Posterior wall: [] minimum     [] moderate     [] severe      [x] WNL      Palpation: via vaginal canal   Superficial Pelvic Floor Musculature (PFM): Tender? Intermediate PFM Tender? Deep PFM Tender?    Superficial Transverse Perineal [x] Right  [x] Left  []DNT Deep Transverse Perineal [x] Right  [x] Left  []DNT Puborectalis [] Right  [] Left  []DNT   Ischiocavernosus [x] Right  [x] Left  []DNT Compressor Urethra [] Right  [] Left  []DNT Pubococcygeus [] Right  [] Left  []DNT   Bulbocavernosus [] Right  [] Left  []DNT   Ileococcygeus [] Right  [] Left  []DNT       Obturator Internus [] Right  [] Left  []DNT       Coccygeus [] Right  [] Left  []DNT             Pubic Symphysis Dysfunction Positive/Negative Sx onset in seconds   Single leg stance/Trendelenburg     Palpation of pubic symphysis (+ if >5 seconds) positive      SI Special Tests:  Iliosacral   Supine to Sit- positive    SI Provocation Tests:  Gap- positive  Compression- positive    TrA activation- weak       ASSESSMENT   Initial Assessment:  Cesia Munoz presents with musculoskeletal limitations including pelvic floor muscle (PFM) tension, reduced PFM Range of Motion (ROM), increased tenderness to palpation of the PFM, poor posture, altered body mechanics, reduced coordination and awareness of PFM, reduced hip strength, poor abdominal strength, and decreased pelvic floor muscle (PFM) strength. These limitations are impairing the patient's ability to ambulate and perform ADL's without pain. As a result, she is limited in her/his ability to participate in household chores, physical activities such as walking, swimming, or other exercise, entertainment activities such as going to a movie or concert, traveling by car or bus for a distance greater then 30 minutes away from home, and social activities outside of the home. Problem List: (Impacting functional limitations): Body Structures, Functions, Activity Limitations Requiring Skilled Therapeutic Intervention: Decreased functional mobility ; Decreased ADL status; Decreased ROM; Decreased body mechanics; Decreased strength; Decreased endurance; Decreased fine motor control; Decreased coordination; Increased pain; Decreased posture     Therapy Prognosis:   Therapy Prognosis: Fair     Initial Assessment Complexity:   Decision Making: Low Complexity    PLAN   Effective Dates: 1-25-23 TO Plan of Care/Certification Expiration Date: 04/25/23   Frequency/Duration: Plan Frequency: one time a week for 90 days   Interventions Planned (Treatment may consist of any combination of the following):    Current Treatment Recommendations: Strengthening; ROM; ADL/Self-care training; Neuromuscular re-education; Manual; Pain management; Home exercise program; Safety education & training; Patient/Caregiver education & training; Equipment evaluation, education, & procurement; Modalities;  Therapeutic activities     Goals: (Goals have been discussed and agreed upon with patient.)  Short-Term Functional Goals: Time Frame: 6 weeks  Pt will demonstrate I with basic PFM HEP to improve awareness, coordination, and timing of PFM. Patient will demonstrate ability to perform diaphragmatic breathing in multiple positions to assist in pelvic floor tension reduction. Patient will verbalize understanding and demonstrate postural adjustments which facilitate lengthening and reduce overall pelvic floor muscle activity. Patient will be able to identify triggers for muscle guarding and be able to teach back 3 exercises or strategies that may be performed daily to reduce pain or discomfort. Patient will be able to demonstrate proper activation of Transversus Abdominus to help support the pelvis during pregnancy and help reduce pain/discomfort  Discharge Goals: Time Frame: 12 weeks  Patient will demonstrate independence with a home exercise program for aerobic conditioning, core stabilization, and general mobility for independent management of symptoms. Pelvic Floor Impact Questionnaire--short form 7 (PFIQ-7):  Score:  Initial:   Bladder or Urine: 0/100  Bowel or Rectum: 0/100  Vagina or Pelvis: 71/100 Most Recent: X (Date: -- )  Bladder or Urine: X/100  Bowel or Rectum: X/100  Vagina or Pelvis: X/100   Interpretation of Score: Each of the 7 sections is scored on a scale from 0-3; 0 representing \"Not at all\", 3 representing \"Quite a bit\". The mean value is taken from all the answered items, then multiplied by 100 to obtain the scale score, ranging from 0-100. This process is repeated for each column representing bowel, bladder, and pelvic pain. Medical Necessity:   > Patient demonstrates good rehab potential due to higher previous functional level. Reason For Services/Other Comments:  > Patient continues to require skilled intervention due to above mentioned deficits. Total Duration:  Time In: 1100  Time Out: 1200    Regarding Dejan Dennis's therapy, I certify that the treatment plan above will be carried out by a therapist or under their direction. Thank you for this referral,  Alva Santa.  Monica Smith Referring Physician Signature: Britt Marshall MD No Signature is Required for this note.         Post Session Pain  Charge Capture  PT Visit Info MD Guidelines  MyChart

## 2023-01-25 ENCOUNTER — HOSPITAL ENCOUNTER (OUTPATIENT)
Dept: PHYSICAL THERAPY | Age: 28
Setting detail: RECURRING SERIES
Discharge: HOME | End: 2023-01-28
Payer: COMMERCIAL

## 2023-01-25 PROCEDURE — 97110 THERAPEUTIC EXERCISES: CPT

## 2023-01-25 PROCEDURE — 97161 PT EVAL LOW COMPLEX 20 MIN: CPT

## 2023-01-25 ASSESSMENT — PAIN SCALES - GENERAL: PAINLEVEL_OUTOF10: 9

## 2023-02-01 ENCOUNTER — HOSPITAL ENCOUNTER (OUTPATIENT)
Dept: PHYSICAL THERAPY | Age: 28
Setting detail: RECURRING SERIES
End: 2023-02-01
Payer: COMMERCIAL

## 2023-02-01 NOTE — PROGRESS NOTES
DATE: 2/1/2023    Patient canceled appointment less than 24 hours notice due to too much pain. Lianet Garcia.  Shayy Laws

## 2023-02-09 ENCOUNTER — HOSPITAL ENCOUNTER (OUTPATIENT)
Dept: PHYSICAL THERAPY | Age: 28
Setting detail: RECURRING SERIES
Discharge: HOME | End: 2023-02-12
Payer: COMMERCIAL

## 2023-02-09 PROCEDURE — 97110 THERAPEUTIC EXERCISES: CPT

## 2023-02-09 PROCEDURE — 97530 THERAPEUTIC ACTIVITIES: CPT

## 2023-02-09 ASSESSMENT — PAIN SCALES - GENERAL: PAINLEVEL_OUTOF10: 9

## 2023-02-09 NOTE — PROGRESS NOTES
Daria Bayron  : 1995  Primary: Travis Laws Sc ([facility])  Secondary:  [address] @ [employer] [facility] Nava [address] [address]  Phone: [phone]  Fax: [phone] Plan Frequency: one time a week for 90 days  Plan of Care/Certification Expiration Date: 23      PT Visit Info:  Plan Frequency: one time a week for 90 days  Plan of Care/Certification Expiration Date: 23      Visit Count:  2    OUTPATIENT PHYSICAL THERAPY:OP NOTE TYPE: Treatment Note 2023       Episode  }Appt Desk             Treatment Diagnosis:   Lack of coordination (muscle incoordination) (R27.8)  Pelvic floor dysfunction in female (M62.98)  Generalized weakness (M62.81)  Pelvic and perineal pain (R10.2)  Low back pain (M54.5 )  Medical/Referring Diagnosis:  Pelvic and perineal pain [R10.2]  Referring Physician:  José Ohara MD MD Orders:  PT Eval and Treat   Date of Onset:  No data recorded   Allergies:   Latex, Shellfish allergy, and Tomato  Restrictions/Precautions:  No data recorded  No data recorded   Interventions Planned (Treatment may consist of any combination of the following):    Current Treatment Recommendations: Strengthening; ROM; ADL/Self-care training; Neuromuscular re-education; Manual; Pain management; Home exercise program; Safety education & training; Patient/Caregiver education & training; Equipment evaluation, education, & procurement; Modalities; Therapeutic activities     Subjective Comments: Patient reports the lower the baby goes the more pelvic pain she has. She has to sleep on top of all her pillows. She feels less pressure when she doesn't wear anything on her bottom half.       Initial:}    9/10Post Session:        /10  Medications Last Reviewed:  2023  Updated Objective Findings:    Ms. Rocha is a 25 yo female referred to pelvic floor physical therapy (PFPT) by José Ohara MD 2 Pelvic and perineal pain [R10.2] Pt reports that symptoms began 10 weeks ago. Patient is 30 weeks. She reports pelvic/back/pubic bone pain. If she walks it can shoot into her inner thighs and down legs. If she sits for a long time she will get pain in hips/lower abdomen. She has a belly belt. It doesn't help. Gomez band in her lower back. Baby is pressing on her spine. Pain is eased with lying down. Tossing and turning hurts. Urinary: Frequency 9-12 x/day, 2-3 x/night. Positive for urinary urgency and urinary frequency. Negative for stress urinary incontinence, urge urinary incontinence, incomplete emptying, urinary hesitancy/intermittency, dysuria, hematuria, nocturia, and nocturnal enuresis. Pt does not use pads for urinary protection; 0 pads per day (PPD). Fluid intake: couple cups water/day; bladder irritants include: milk. Pt does not limit fluid intake due to bladder control. Bowel: Frequency every other day. Positive for  none . Negative for pain with bowel movement, pushing/straining with bowel movement, fecal incontinence, constipation, and incomplete emptying. Pt does not use pads for bowel protection; 0 pads per day (PPD). Use of stool softeners or laxatives? No     Sexual: Pt is not sexually active Contraception/birth control: none. negative for dyspareunia. Pelvic Organ Prolapse/Pelvic Pain: 10/10 all the time unless lying down its 8/9. OB History: Number of pregnancies: 2 Number of vaginal deliveries: 1 Number of c-sections: 0.    External Observations:  Voluntary contraction: [] absent     [x] present  Involuntary contraction: [] absent     [x] present  Involuntary relaxation: [] absent     [x] present  Perineal descent at rest: [] absent     [x] present  Perineal descent with bearing: [] absent     [x] present  Postural assessment: increased lumbar lordosis, patient using belly band  Gait: antalgic     Pelvic Floor Muscle (PFM) Assessment:  Vaginal vault size: [] decreased     [] increased     [x] WNL  Muscle volume: [] decreased     [x] WNL     [] Defect  PFM tension: [] decreased     [x] increased     [] WNL     Contraction ability:  Voluntary contraction: [] absent     [x] weak     [] moderate      [] strong  Voluntary relaxation: [] absent     [x] partial     [] complete   Overflow: [] absent     [] min     [x] mod     [] severe / Compensatory mm groups include abdominals  Levator Avulsion: [x] Negative  [] Positive     Tissue laxity test:  Anterior wall: [] minimum     [] moderate     [] severe      [x] WNL  Posterior wall: [] minimum     [] moderate     [] severe      [x] WNL        Palpation: via vaginal canal   Superficial Pelvic Floor Musculature (PFM): Tender? Intermediate PFM Tender? Deep PFM Tender? Superficial Transverse Perineal [x] Right  [x] Left  []DNT Deep Transverse Perineal [x] Right  [x] Left  []DNT Puborectalis [] Right  [] Left  []DNT   Ischiocavernosus [x] Right  [x] Left  []DNT Compressor Urethra [] Right  [] Left  []DNT Pubococcygeus [] Right  [] Left  []DNT   Bulbocavernosus [] Right  [] Left  []DNT     Ileococcygeus [] Right  [] Left  []DNT           Obturator Internus [] Right  [] Left  []DNT           Coccygeus [] Right  [] Left  []DNT                  Pubic Symphysis Dysfunction Positive/Negative Sx onset in seconds   Single leg stance/Trendelenburg       Palpation of pubic symphysis (+ if >5 seconds) positive        SI Special Tests:  Iliosacral   Supine to Sit- positive     SI Provocation Tests:  Gap- positive  Compression- positive     TrA activation- weak    Treatment   THERAPEUTIC EXERCISE: (15 minutes):    Exercises per grid below to improve mobility, strength, and coordination. Required minimal visual, verbal, manual, and tactile cues to promote proper body alignment, promote proper body posture, promote proper body mechanics, and promote proper body breathing techniques. Progressed resistance, range, repetitions, and complexity of movement as indicated. Date:  1-25-23 Date:  2-9-23 Date:     Activity/Exercise Parameters Parameters Parameters   Patient Education Discussed HEP and POC. Discussed contraction of core to stabilize pelvis during transitional movements     TrA activation X10 and given handout X5 in sidelying with pillows for support    Pelvic Band/Belt Educated patient on two different types of wear to support back and pelvis     Glute Sets  In sidelying x 10 reps                   All exercises performed with emphasis on kegel and breathing in order improve coordination, awareness and to minimize symptoms. MANUAL THERAPY: ( minutes): to improve soft tissue tone    Date Type Location Comments   2/9/2023 Internal assessment/treatment pelvic Left HS/Right quads for MET                                       (Used abbreviations: MET - muscle energy technique; SCS- Strain counter strain; CTM-Connective tissue mobilizations; CR- Contract/Relax; SP- Sustained pressure; PIT- Positional inhibition techniques; STM Soft -tissue mobilization; MM- Myofascial mobilization; TrP-Trigger point release; IASTM- Instrument assisted soft tissue mobilizations, TDN-Trigger point dry needling)    Pt gives verbal consent to internal vaginal assessment/treatment without chaperon present. NEURO REEDUCATION: () to improve control and coordination of pelvic floor     Date:   Date:   Date:     Activity/Exercise Parameters Parameters Parameters   Biofeedback With sEMG was utilized for coordination of PFM.                                                 THERAPEUTIC ACTIVITY: (25 minutes):     TA Educational Topic Notes Date Completed   Pathology/Anatomy/PFM Function     Bladder health education     Urinary urge suppression     The knack     Voiding strategies     Nocturia tips     Bowel/Bladder log     Bowel health education     Constipation care     Diarrhea/Fecal leakage     Colonic massage     Toilet positioning     Defecation dynamics     Sources of fiber     Return to intercourse/Dilator training     Sexual positioning     Lubricants/vaginal moisturizers     Vaginal irritants     Body mechanics Log rolling 2-9-23   Posture/ergonomics Pain management positioning with pillows, bed 2-9-23   Diaphragmatic breathing Discussed for pain management. And Performed in sidelying 2-9-23   Resources and technology     Other Ice versus heat 2-9-23         Treatment/Session Summary:    Treatment Assessment: Patient continues to be in a lot of pain. Provided education on positioning, pillows, breathing, modalities. Core activation does increase pelvic pain but glute activation helps decrease pain. Communication/Consultation:  None today  Equipment provided today:  None  Recommendations/Intent for next treatment session: Next visit will focus on core. Total Treatment Billable Duration:  15 minutes there ex, 25 minutes there act  Time In: 1400  Time Out: [address] [facility] [address].  Peyton Carter, PT       Charge Capture  }Post Session Pain  PT Visit Info  [address] [facility] [address] Portal  MD Guidelines  Scanned Media  Benefits  MyChart    Future Appointments   Date Time Provider Bridgett Nolasco   2/15/2023  2:00 PM [facility]   2/20/2023  2:00 PM [facility]   2/27/2023  2:00 PM [facility]

## 2023-02-15 ENCOUNTER — HOSPITAL ENCOUNTER (OUTPATIENT)
Dept: PHYSICAL THERAPY | Age: 28
Setting detail: RECURRING SERIES
End: 2023-02-15
Payer: COMMERCIAL

## 2023-02-15 NOTE — PROGRESS NOTES
DATE: 2/15/2023    Patient canceled appointment less than 24 hours notice. Kevin Jackson.  Zaire Delarosa

## 2023-02-20 ENCOUNTER — HOSPITAL ENCOUNTER (OUTPATIENT)
Dept: PHYSICAL THERAPY | Age: 28
Setting detail: RECURRING SERIES
End: 2023-02-20
Payer: COMMERCIAL

## 2023-02-20 NOTE — PROGRESS NOTES
Sander Brown  : 1995  Primary: Alverto Session Sc ([facility])  Secondary:  [address] @ [employer] [facility] [address] [address]  Phone: [phone]  Fax: [phone] Plan Frequency: one time a week for 90 days  Plan of Care/Certification Expiration Date: 23      PT Visit Info:  Plan Frequency: one time a week for 90 days  Plan of Care/Certification Expiration Date: 23      Visit Count:  3    OUTPATIENT PHYSICAL THERAPY:OP NOTE TYPE: Treatment Note 2023       Episode  }Appt Desk             Treatment Diagnosis:   Lack of coordination (muscle incoordination) (R27.8)  Pelvic floor dysfunction in female (M62.98)  Generalized weakness (M62.81)  Pelvic and perineal pain (R10.2)  Low back pain (M54.5 )  Medical/Referring Diagnosis:  Pelvic and perineal pain [R10.2]  Referring Physician:  Danny Aguirre MD MD Orders:  PT Eval and Treat   Date of Onset:  No data recorded   Allergies:   Latex, Shellfish allergy, and Tomato  Restrictions/Precautions:  No data recorded  No data recorded   Interventions Planned (Treatment may consist of any combination of the following):    Current Treatment Recommendations: Strengthening; ROM; ADL/Self-care training; Neuromuscular re-education; Manual; Pain management; Home exercise program; Safety education & training; Patient/Caregiver education & training; Equipment evaluation, education, & procurement; Modalities; Therapeutic activities     Subjective Comments: Patient reports the lower the baby goes the more pelvic pain she has. She has to sleep on top of all her pillows. She feels less pressure when she doesn't wear anything on her bottom half.       Initial:}     /10Post Session:        /10  Medications Last Reviewed:  2023  Updated Objective Findings:    Ms. Yadira Murdock is a 23 yo female referred to pelvic floor physical therapy (PFPT) by Danny Aguirre MD 2 Pelvic and perineal pain [R10.2] Pt reports that symptoms began 10 weeks ago. Patient is 30 weeks. She reports pelvic/back/pubic bone pain. If she walks it can shoot into her inner thighs and down legs. If she sits for a long time she will get pain in hips/lower abdomen. She has a belly belt. It doesn't help. Gomez band in her lower back. Baby is pressing on her spine. Pain is eased with lying down. Tossing and turning hurts. Urinary: Frequency 9-12 x/day, 2-3 x/night. Positive for urinary urgency and urinary frequency. Negative for stress urinary incontinence, urge urinary incontinence, incomplete emptying, urinary hesitancy/intermittency, dysuria, hematuria, nocturia, and nocturnal enuresis. Pt does not use pads for urinary protection; 0 pads per day (PPD). Fluid intake: couple cups water/day; bladder irritants include: milk. Pt does not limit fluid intake due to bladder control. Bowel: Frequency every other day. Positive for  none . Negative for pain with bowel movement, pushing/straining with bowel movement, fecal incontinence, constipation, and incomplete emptying. Pt does not use pads for bowel protection; 0 pads per day (PPD). Use of stool softeners or laxatives? No     Sexual: Pt is not sexually active Contraception/birth control: none. negative for dyspareunia. Pelvic Organ Prolapse/Pelvic Pain: 10/10 all the time unless lying down its 8/9. OB History: Number of pregnancies: 2 Number of vaginal deliveries: 1 Number of c-sections: 0.    External Observations:  Voluntary contraction: [] absent     [x] present  Involuntary contraction: [] absent     [x] present  Involuntary relaxation: [] absent     [x] present  Perineal descent at rest: [] absent     [x] present  Perineal descent with bearing: [] absent     [x] present  Postural assessment: increased lumbar lordosis, patient using belly band  Gait: antalgic     Pelvic Floor Muscle (PFM) Assessment:  Vaginal vault size: [] decreased     [] increased     [x] WNL  Muscle volume: [] decreased     [x] WNL     [] Defect  PFM tension: [] decreased     [x] increased     [] WNL     Contraction ability:  Voluntary contraction: [] absent     [x] weak     [] moderate      [] strong  Voluntary relaxation: [] absent     [x] partial     [] complete   Overflow: [] absent     [] min     [x] mod     [] severe / Compensatory mm groups include abdominals  Levator Avulsion: [x] Negative  [] Positive     Tissue laxity test:  Anterior wall: [] minimum     [] moderate     [] severe      [x] WNL  Posterior wall: [] minimum     [] moderate     [] severe      [x] WNL        Palpation: via vaginal canal   Superficial Pelvic Floor Musculature (PFM): Tender? Intermediate PFM Tender? Deep PFM Tender? Superficial Transverse Perineal [x] Right  [x] Left  []DNT Deep Transverse Perineal [x] Right  [x] Left  []DNT Puborectalis [] Right  [] Left  []DNT   Ischiocavernosus [x] Right  [x] Left  []DNT Compressor Urethra [] Right  [] Left  []DNT Pubococcygeus [] Right  [] Left  []DNT   Bulbocavernosus [] Right  [] Left  []DNT     Ileococcygeus [] Right  [] Left  []DNT           Obturator Internus [] Right  [] Left  []DNT           Coccygeus [] Right  [] Left  []DNT                  Pubic Symphysis Dysfunction Positive/Negative Sx onset in seconds   Single leg stance/Trendelenburg       Palpation of pubic symphysis (+ if >5 seconds) positive        SI Special Tests:  Iliosacral   Supine to Sit- positive     SI Provocation Tests:  Gap- positive  Compression- positive     TrA activation- weak    Treatment   THERAPEUTIC EXERCISE: (15 minutes):    Exercises per grid below to improve mobility, strength, and coordination. Required minimal visual, verbal, manual, and tactile cues to promote proper body alignment, promote proper body posture, promote proper body mechanics, and promote proper body breathing techniques. Progressed resistance, range, repetitions, and complexity of movement as indicated. Date:  1-25-23 Date:  2-9-23 Date:     Activity/Exercise Parameters Parameters Parameters   Patient Education Discussed HEP and POC. Discussed contraction of core to stabilize pelvis during transitional movements     TrA activation X10 and given handout X5 in sidelying with pillows for support    Pelvic Band/Belt Educated patient on two different types of wear to support back and pelvis     Glute Sets  In sidelying x 10 reps                   All exercises performed with emphasis on kegel and breathing in order improve coordination, awareness and to minimize symptoms.    MANUAL THERAPY: ( minutes): to improve soft tissue tone    Date Type Location Comments   2/20/2023 Internal assessment/treatment pelvic Left HS/Right quads for MET                                       (Used abbreviations: MET - muscle energy technique; SCS- Strain counter strain; CTM-Connective tissue mobilizations; CR- Contract/Relax; SP- Sustained pressure; PIT- Positional inhibition techniques; STM Soft -tissue mobilization; MM- Myofascial mobilization; TrP-Trigger point release; IASTM- Instrument assisted soft tissue mobilizations, TDN-Trigger point dry needling)    Pt gives verbal consent to internal vaginal assessment/treatment without chaperon present.    NEURO REEDUCATION: () to improve control and coordination of pelvic floor     Date:   Date:   Date:     Activity/Exercise Parameters Parameters Parameters   Biofeedback With sEMG was utilized for coordination of PFM.                                                THERAPEUTIC ACTIVITY: (25 minutes):     TA Educational Topic Notes Date Completed   Pathology/Anatomy/PFM Function     Bladder health education     Urinary urge suppression     The knack     Voiding strategies     Nocturia tips     Bowel/Bladder log     Bowel health education     Constipation care     Diarrhea/Fecal leakage     Colonic massage     Toilet positioning     Defecation dynamics     Sources of fiber     Return to  intercourse/Dilator training     Sexual positioning     Lubricants/vaginal moisturizers     Vaginal irritants     Body mechanics Log rolling 2-9-23   Posture/ergonomics Pain management positioning with pillows, bed 2-9-23   Diaphragmatic breathing Discussed for pain management. And Performed in sidelying 2-9-23   Resources and technology     Other Ice versus heat 2-9-23         Treatment/Session Summary:    Treatment Assessment: Patient continues to be in a lot of pain. Provided education on positioning, pillows, breathing, modalities. Core activation does increase pelvic pain but glute activation helps decrease pain. Communication/Consultation:  None today  Equipment provided today:  None  Recommendations/Intent for next treatment session: Next visit will focus on core. Total Treatment Billable Duration:  15 minutes there ex, 25 minutes there act       Delesli Conde.  Delisa Kunz, CHRISTINE       Charge Capture  }Post Session Pain  PT Visit Info  [address] Portal  MD Guidelines  Scanned Media  Benefits  MyChart    Future Appointments   Date Time Provider Bridgett Nolasco   2/20/2023  2:00 PM Jodi Baum [facility]   2/27/2023  [facility]